# Patient Record
Sex: FEMALE | URBAN - METROPOLITAN AREA
[De-identification: names, ages, dates, MRNs, and addresses within clinical notes are randomized per-mention and may not be internally consistent; named-entity substitution may affect disease eponyms.]

---

## 2020-09-21 ENCOUNTER — OFFICE VISIT (OUTPATIENT)
Dept: URBAN - METROPOLITAN AREA SURGERY CENTER 23 | Facility: SURGERY CENTER | Age: 60
End: 2020-09-21
Payer: COMMERCIAL

## 2020-09-21 DIAGNOSIS — K62.1 DYSPLASTIC RECTAL POLYP: ICD-10-CM

## 2020-09-21 DIAGNOSIS — Z12.11 ENCOUNTER FOR COLONOSCOPY DUE TO HISTORY OF COLON CANCER: ICD-10-CM

## 2020-09-21 PROCEDURE — G9935 CANC NOT DETECTD DURING SRCN: HCPCS | Performed by: INTERNAL MEDICINE

## 2020-09-21 PROCEDURE — G8907 PT DOC NO EVENTS ON DISCHARG: HCPCS | Performed by: INTERNAL MEDICINE

## 2020-09-21 PROCEDURE — 45380 COLONOSCOPY AND BIOPSY: CPT | Performed by: INTERNAL MEDICINE

## 2020-10-19 ENCOUNTER — OFFICE VISIT (OUTPATIENT)
Dept: URBAN - METROPOLITAN AREA CLINIC 118 | Facility: CLINIC | Age: 60
End: 2020-10-19

## 2024-12-03 ENCOUNTER — OFFICE VISIT (OUTPATIENT)
Dept: URBAN - METROPOLITAN AREA CLINIC 118 | Facility: CLINIC | Age: 64
End: 2024-12-03

## 2024-12-03 NOTE — HPI-TODAY'S VISIT:
Ms. Borrego is a 64 y/o female with PMHx of *** who presents today for colonoscopy consultation.  Her last colonoscopy was 9/2020 with one hyperplastic polyp removed. She was recommended to repeat in 10 years.